# Patient Record
Sex: MALE | Race: WHITE
[De-identification: names, ages, dates, MRNs, and addresses within clinical notes are randomized per-mention and may not be internally consistent; named-entity substitution may affect disease eponyms.]

---

## 2018-09-17 ENCOUNTER — HOSPITAL ENCOUNTER (INPATIENT)
Dept: HOSPITAL 68 - ERH | Age: 63
LOS: 2 days | DRG: 395 | End: 2018-09-19
Attending: SURGERY | Admitting: SURGERY
Payer: COMMERCIAL

## 2018-09-17 VITALS — HEIGHT: 67 IN | WEIGHT: 173 LBS | BODY MASS INDEX: 27.15 KG/M2

## 2018-09-17 DIAGNOSIS — J45.909: ICD-10-CM

## 2018-09-17 DIAGNOSIS — Z88.1: ICD-10-CM

## 2018-09-17 DIAGNOSIS — Z79.51: ICD-10-CM

## 2018-09-17 DIAGNOSIS — F41.9: ICD-10-CM

## 2018-09-17 DIAGNOSIS — K61.2: Primary | ICD-10-CM

## 2018-09-17 DIAGNOSIS — Z88.2: ICD-10-CM

## 2018-09-17 DIAGNOSIS — Z79.52: ICD-10-CM

## 2018-09-17 DIAGNOSIS — F32.9: ICD-10-CM

## 2018-09-17 LAB
ABSOLUTE GRANULOCYTE CT: 12.3 /CUMM (ref 1.4–6.5)
BASOPHILS # BLD: 0 /CUMM (ref 0–0.2)
BASOPHILS NFR BLD: 0.2 % (ref 0–2)
EOSINOPHIL # BLD: 0.4 /CUMM (ref 0–0.7)
EOSINOPHIL NFR BLD: 2.4 % (ref 0–5)
ERYTHROCYTE [DISTWIDTH] IN BLOOD BY AUTOMATED COUNT: 13.2 % (ref 11.5–14.5)
GRANULOCYTES NFR BLD: 75.5 % (ref 42.2–75.2)
HCT VFR BLD CALC: 46.3 % (ref 42–52)
LYMPHOCYTES # BLD: 2.2 /CUMM (ref 1.2–3.4)
MCH RBC QN AUTO: 30.9 PG (ref 27–31)
MCHC RBC AUTO-ENTMCNC: 34.2 G/DL (ref 33–37)
MCV RBC AUTO: 90.3 FL (ref 80–94)
MONOCYTES # BLD: 1.3 /CUMM (ref 0.1–0.6)
PLATELET # BLD: 293 /CUMM (ref 130–400)
PMV BLD AUTO: 7.7 FL (ref 7.4–10.4)
RED BLOOD CELL CT: 5.13 /CUMM (ref 4.7–6.1)
WBC # BLD AUTO: 16.3 /CUMM (ref 4.8–10.8)

## 2018-09-17 PROCEDURE — 0J9B3ZZ DRAINAGE OF PERINEUM SUBCUTANEOUS TISSUE AND FASCIA, PERCUTANEOUS APPROACH: ICD-10-PCS | Performed by: SURGERY

## 2018-09-17 NOTE — ED GENERAL ADULT
History of Present Illness
 
General
Chief Complaint: General Adult
Stated Complaint: "I HAVE A SEVERE RECTAL PROBLEM"
Source: patient, family
Exam Limitations: no limitations
 
Vital Signs & Intake/Output
Vital Signs & Intake/Output
 Vital Signs
 
 
Date Time Temp Pulse Resp B/P B/P Pulse O2 O2 Flow FiO2
 
     Mean Ox Delivery Rate 
 
2121 98.0        
 
1928 98.1 88 20 104/58  93 Room Air  
 
 1649      93   
 
 1620 101.6        
 
 1537      97 Room Air Room Air 
 
 1408 101.6 105 18 118/73  95 Room Air  
 
 1134 99.9 112 20 116/81  95 Room Air  
 
 
 
Allergies
Coded Allergies:
Sulfa (Sulfonamide Antibiotics) (HIVES 18)
amoxicillin (HIVES 18)
clavulanic acid (HIVES 18)
 
Reconcile Medications
Albuterol Sulfate (Proair Hfa) 0.09 MG/Actuation MILLER   2 PUFF INH Q6 ASTHMA  (
Reported)
Albuterol Sulfate/Ipratropiu (Duoneb) 3 MG/3 ML NEB   1 Vial INH/SOL 4 TIMES/DAY
PRN shortness of breath
Alprazolam 0.5 MG TAB   1 TAB PO BID PRN DEPRESSION/ANXIETY  (Reported)
Azithromycin (Zithromax) 250 MG TABLET   1 DP PO AD COPD/BRONCHITIS
     2 the first day followed by 1 for days 2-5
Benzonatate (Tessalon Perle) 100 MG CAPSULE   1 CAP PO TID PRN COUGH
Budesonide/Formoterol Fumara (Symbicort 160-4.5 Mcg Inhaler) 160 MCG/4.5 MCG PUF
  2 PUF INH BID ASTHMA
Guaifenesin/Dextromethorphan (Mucinex Dm -30 MG Tablet) 1 EACH TAB.ER.12H 
 1 TAB PO BIDP PRN BREATHING PROBLEMS  (Reported)
Ipratropium/Albuterol Sulfate (Iprat-Albut 0.5-3(2.5) MG/3 Ml) 0.5 MG-3 MG (2.5 
MG BASE)/3 ML AMPUL.NEB   3 ML INH Q6HR PRN WHEEZING
Ipratropium/Albuterol Sulfate (Iprat-Albut 0.5-3(2.5) MG/3 Ml) 3 ML AMPUL.NEB   
1 VIAL INH Q4-6 PRN WHEEZING
Loratadine 10 MG TABLET   1 MG PO DAILY PRN ALLERGIES
Montelukast Sodium (Singulair) 10 MG TABLET   1 TAB PO AT BEDTIME shortness of 
breath
Prednisone (Deltasone) 20 MG TABLET   3 TAB PO DAILY ASTHMA
Prednisone 10 MG TABLET   0 PO DAILY ASTHMA
     6 TABS PO DAY 1
     5 TABS PO DAY2
     4 TABS PO DAY 3
     3 TABS PO DAY 4
     2 TABS PO DAY 5
     1 TAB PO DAY 6 AND 7
Prednisone 10 MG TABLET   1 TAB PO AD ASTHMA
     DAY1/DAY2 FOUR TABS
     DAY3/DAY4 THREE TABS
     DAY5/DAY6 TWO TABS
     DAY 7 ONE TAB
Robitussin AC (Guaifenesin-Codeine Syrup) 200 MG-20 MG/10 ML LIQUID   10 ML PO 
Q6HR PRN COUGH
Robitussin AC (Guaifenesin-Codeine Syrup) 10 ML LIQUID   10 ML PO TID PRN COUGH
 
Triage Note:
PT TO ER C/C PAIN X 10 DAYS.  STARTED IN LOW ABD
AREA.  PAIN IS NOW ALSO TO RECTAL AREA, AMADOU KIDNEY
AREA, LOW BACK AND AMADOU HIP AREA.  PAIN IS CONSTANT
SINCE ONSET.  HX OF UMBILICAL HERNIA WHICH HAS NOT
BEEN REPAIRED.  LAST BM YESTERDAY, STATES HAD TO
USE A SUPPOSITORY AND VERY LITTLE CAME OUT.  LNBM
LAST WEEK.  STATES HAS DIFFICULTY URINATING AND
HAS TO STIMULATE HIS RECTAL AREA IN ORDER TO GET
URINE OUT.  LAST NORMAL URINARY VOID LAST WEEK
(MONDAY OR TUESDAY).
Triage Nurses Notes Reviewed? yes
Onset: Abrupt
Duration: day(s): (10), constant, continues in ED, getting worse
Timing: single episode today
Injury Environment: home
Severity: moderate, severe
Severity Numbers: 10
No Modifying Factors: none
Modifying Factors:
Worsens With: movement, other (BM). 
HPI:
63-year-old male history of anxiety depression bronchitis presents for 
evaluation of perianal and perirectal pain.  Patient reports he has had this 
pain for the past 10 days is getting worse.  The pain is located in the area of 
the rectum.  The pain is worse with bowel movements.  He states he has an area 
of swelling that he can feel Right outside of his anal area.  He also reports 
that he has not been having normal bowel movements recently and feels like he is
constipated.  He states he did have a small bowel movement yesterday.  No 
diarrhea.  He also reports his asthma symptoms have been worse than usual with 
wheezing and coughing.  He has had fever at home.  He is not taking any pain 
medicine.  Denies trauma to the area.  Additionally he reports because of that 
she's been having difficulty urinating.  He was able to urinate only a small 
amount earlier today.
(Robin Avendano)
 
Past History
 
Travel History
Traveled to [place] past 21 day No
 
Medical History
Any Pertinent Medical History? see below for history
Neurological: migraine, NUMBNESS TO JOINTS
EENT: allergies
Cardiovascular: NONE
Respiratory: asthma, bronchitis, pneumonia
Gastrointestinal: umbilical hernia
Hepatic: NONE
Renal: nephrolithiasis
Musculoskeletal: chronic back pain, falls
Psychiatric: anxiety, depression
Endocrine: NONE
Blood Disorders: NONE
Cancer(s): NONE
GYN/Reproductive: NONE
History of MRSA: No
History of VRE: No
History of CDIFF: No
 
Surgical History
Surgical History: non-contributory
 
Psychosocial History
Who do you live with Spouse
Services at Home None
What is your primary language English
Tobacco Use: Quit >30 days ago
ETOH Use: occasional use
Illicit Drug Use: denies illicit drug use
 
Family History
Family History, If Any:
MOTHER
  FH: diabetes mellitus
 
Hx Contributory? No
(Robin Avendano)
 
Review of Systems
 
Review of Systems
Constitutional:
Reports: no symptoms. 
EENTM:
Reports: no symptoms. 
Respiratory:
Reports: no symptoms. 
Cardiovascular:
Reports: no symptoms. 
GI:
Reports: see HPI, abdominal pain, constipation. 
Genitourinary:
Reports: no symptoms. 
Musculoskeletal:
Reports: no symptoms. 
Skin:
Reports: no symptoms. 
Neurological/Psychological:
Reports: no symptoms. 
Hematologic/Endocrine:
Reports: no symptoms. 
Immunologic/Allergic:
Reports: no symptoms. 
All Other Systems: Reviewed and Negative
(Robin Avendano)
 
Physical Exam
 
Physical Exam
General Appearance: well developed/nourished, no apparent distress, alert, awake
Head: atraumatic, normal appearance
Eyes:
Bilateral: normal appearance, PERRL, EOMI. 
Ears, Nose, Throat: hearing grossly normal
Neck: normal inspection, supple, full range of motion
Respiratory: chest non-tender, no respiratory distress, rhonchi, wheezing
Cardiovascular: regular rate/rhythm, normal peripheral pulses
Peripheral Pulses:
2+ radial (R), 2+ radial (L)
Gastrointestinal: soft, non-tender
Rectal: normal rectal tone, heme negative stool, THERE IS AN AREA OF INDURATION 
ABOUT 4CM IN DIAMETER  LOCATED JUST TO THE LEFT OF THE ANAL CANAL..  iNDURATION 
TRACKS UPWARDS TO HIS SACRUM.  tHIS AREA IS MILDLY ERYTHEMATOUS.  nO DISCHARGE. 
aREA IS EXQUISITELY TENDER.  nO FOCAL FLUCTUANT AREA PALPATED
Back: normal inspection, normal range of motion, no vertebral tenderness
Extremities: normal inspection, normal range of motion, no edema
Neurologic/Psych: no motor/sensory deficits, awake, alert, oriented x 3, normal 
gait, normal mood/affect
Skin: intact, normal color, warm/dry
Lymphatic: no anterior cervical tavon
 
Core Measures
ACS in differential dx? No
CVA/TIA Diagnosis: No
Sepsis Present: Yes
Sepsis Focused Exam Completed? Yes
(Robin Avendano)
 
ED Sepsis Exam
Date of Focused Sepsis Exam: 18
Time of Focused Sepsis Exam: 
Sepsis Cardiac Exam: Tachycardia
Sepsis Resp Exam: Ronchi
Sepsis Cap Refill Exam: <2 Sec
Sepsis Peripheral Pulse Exam: Normal
Sepsis Peripheral Pulse Location: Radial
Sepsis Skin Color Exam: Normal for Ethnicity
Skin Temp/Moisture Exam: Hot/Dry
(Robin Avendano)
 
Progress
Differential Diagnoses
I considered the following diagnoses in my evaluation of the patient: [Perianal 
abscess, perirectal abscess, fistula, sepsis, pneumonia, bronchitis]
 
Plan of Care:
 Orders
 
 
Procedure Date/time Status
 
CBC WITHOUT DIFFERENTIAL  0600 Active
 
Nothing by Mouth  B Active
 
Regular Diet  D Complete
 
TRC EVALUATION (GEN) 2140 Active
 
Pathway - chart 2140 Active
 
Code Status  214 Active
 
Patient Data  211 Active
 
TRUNK AREA CULTURE 2103 Active
 
ED Holding Orders 2058 Active
 
Admit to inpatient 2058 Active
 
Vital Signs 2058 Active
 
Code Status  205 Complete
 
LACTIC ACID  1956 Complete
 
Add-on Test (ER Only)  1644 Active
 
CULTURE,URINE  1607 Active
 
BLOOD CULTURE  1605 Active
 
URINALYSIS  1227 Complete
 
TROPONIN LEVEL  1227 Complete
 
COMPREHENSIVE METABOLIC PANEL  1227 Complete
 
CBC WITHOUT DIFFERENTIAL  1227 Complete
 
EKG  1142 Active
 
VTE Mechanical Prophylaxis   UNK Active
 
Vital Signs   UNK Active
 
Intake & Output   UNK Active
 
Activity/Ambulation   UNK Active
 
 
 Current Medications
 
 
  Sig/Annamarie Start time  Last
 
Medication Dose  Stop Time Status Admin
 
Docusate Sodium 100 MG BID  0900 AC 
 
(Colace)     
 
Vancomycin HCl 1,000 MG DAILY  0900 AC 
 
Sodium Chloride 250 ML    
 
(Normal Saline 0.9%)     
 
Ceftazidime 1,000 MG IQ8  0000 AC 
 
(Fortaz)     
 
Morphine Sulfate 2 MG Q4P PRN  2200 AC 
 
(MORPHINE SULFATE)     
 
Oxycodone/ 2 TAB Q4P PRN  2200 AC 
 
Acetaminophen     
 
(Percocet)     
 
Oxycodone/ 1 TAB Q4P PRN  2200 AC 
 
Acetaminophen     
 
(Percocet)     
 
Acetaminophen 650 MG Q6PRN PRN  2130 AC 
 
(Tylenol)     
 
Albuterol Sulfate 2 PUF Q4 PRN  213 AC 
 
(Ventolin)     
 
Dextrose/Sodium  1,000 ML .Q8H  213 AC 
 
Chloride     2236
 
(D5-Normal Saline)     
 
Ondansetron HCl 4 MG Q6-PRN PRN 2130 AC 
 
(Zofran)     
 
Ipratropium Bromide 2.5 ML ONCE ONE  1615 CAN 
 
(Atrovent)    161  
 
Prednisone 60 MG ONCE ONE  1615 CAN 
 
    1616  
 
 
 Laboratory Tests
 
 
 
18 1807:
Lactic Acid 0.6  L
 
18 1656:
Lactic Acid Cancelled
 
18 1600:
Urine Color YEL, Urine Clarity CLEAR, Urine pH 6.0, Ur Specific Gravity 1.020, 
Urine Protein NEG, Urine Ketones TRACE  H, Urine Nitrite NEG, Urine Bilirubin 
NEG, Urine Urobilinogen 0.2, Ur Leukocyte Esterase NEG, Ur Microscopic EXAM NOT 
REQUIRED, Urine Hemoglobin NEG, Urine Glucose NEG
 
18 1252:
Anion Gap 9, Estimated GFR > 60, BUN/Creatinine Ratio 13.3, Glucose 87, Calcium 
9.3, Total Bilirubin 1.4  H, AST 21, ALT 30, Alkaline Phosphatase 65, Troponin I
< 0.01, Total Protein 7.3, Albumin 4.5, Globulin 2.8, Albumin/Globulin Ratio 1.6
, CBC w Diff MAN DIFF ORDERED, RBC 5.13, MCV 90.3, MCH 30.9, MCHC 34.2, RDW 13.2
, MPV 7.7, Gran % 75.5  H, Lymphocytes % 13.7  L, Monocytes % 8.2, Eosinophils %
2.4, Basophils % 0.2, Absolute Granulocytes 12.3  H, Absolute Lymphocytes 2.2, 
Absolute Monocytes 1.3  H, Absolute Eosinophils 0.4, Absolute Basophils 0, 
Platelet Estimate ADEQUATE, Normocytic RBCs VERIFIED, Normochromic RBCs VERIFIED
 Microbiology
2100  TRUNK: Culture & Sensitivity - RECD
2100  TRUNK: Gram Stain - RECD
 1750  BLOOD: Blood Culture - RECD
 1607  URINE ROUT: Urine Culture - ORD
 1605  BLOOD: Blood Culture - RECD
 
Patient is here for evaluation of rectal pain.  On exam he does have what 
appears to be a perianal or perirectal abscess.  A CT scan will be obtained for 
further evaluation.  Patient has a temp of 101 here and is tachycardic.  He also
has wheezing on exam.  DuoNeb Solu-Medrol was ordered.  Patient was given IV 
Tylenol fluids and Toradol.
 
Blood work shows a white blood cell count of 16,000.  Patient has had a 
persistent fever of 101 here.  CT scan shows a 1 x 2 x 5 cm perirectal/perianal 
abscess.  Patient HAS met sepsis criteria.  Vancomycin and Fortaz was ordered 
along with blood cultures.
 
Spoke with Dr. Ventura from general surgery will have the surgical PA come down 
and evaluate the patient.
 
Patient be admitted by surgery.  He'll require IV antibiotics serial labs 
antibiotics IV fluids
Diagnostic Imaging:
Viewed by Me: Radiology Read.  Discussed w/RAD: Radiology Read. 
Radiology Impression: PATIENT: WILL DOUGLAS  MEDICAL RECORD NO: [id number] PRESENT 
AGE: 63  PATIENT ACCOUNT NO: [id number] : 57  LOCATION: [facility] ORDERING 
PHYSICIAN: Robin BROWN     SERVICE DATE:  EXAM TYPE: RAD - XRY-
PORTABLE CHEST XRAY EXAMINATION: XR PORTABLE CHEST CLINICAL INFORMATION: Cough. 
Shortness of breath, fever. Presumptive diagnosis of pneumonia, CHF. COMPARISON:
No prior chest x-rays, most recent of which is dated 2018. TECHNIQUE: 
Portable AP semierect view of the chest was obtained. FINDINGS: The 
cardiomediastinal silhouette is within normal limits in size. Lungs bilaterally 
are symmetrically expanded. Minimal chronic linear atelectasis is seen in the 
left lung base. No focal consolidation, effusion, pulmonary edema or 
pneumothorax is seen. Nonfusion of the posterior elements in the lower cervical 
spine is seen. Minimal convex right thoracolumbar scoliosis is seen. Bony 
structures are otherwise unremarkable. IMPRESSION: Minimal chronic linear 
atelectasis in the left lung base. No focal pneumonia. No evidence of pulmonary 
edema. DICTATED BY: Cindy Ott MD  DATE/TIME DICTATED:18 
:RAD.CEVALLOS  DATE/TIME TRANSCRIBED:18 CONFIDENTIAL, 
DO NOT COPY WITHOUT APPROPRIATE AUTHORIZATION.  <Electronically signed in Other 
Vendor System>                         
Initial ED EKG: normal sinus rhythm, RBBB
(Robin Avendano)
 
Departure
 
Departure
Disposition: STILL A PATIENT
Condition: Stable
Clinical Impression
Primary Impression: Perirectal abscess
Secondary Impressions: 
Sepsis
Qualifiers:  Sepsis type: sepsis due to unspecified organism Qualified Code: 
A41.9 - Sepsis, unspecified organism
 
Referrals:
Roe SHAW,Anant JOAQUIN (PCP/Family)
 
Departure Forms:
Customer Survey
General Discharge Information
 
Admission Note
Spoke With:
Jann Ventura DO
Documentation of Exam:
Documentation of any treatments & extenuating circumstances including Concerns 
Regarding Discharge (functional status, medication knowledge or non-compliance, 
living conditions, etc.) that warrant an admission rather than observation: [
Serial labs IV pain meds IV fluids IV antibiotics surgical consult monitoring of
signs]
 
(Robin Avendano)
 
PA/NP Co-Sign Statement
Statement:
ED Attending supervision documentation-
 
[X] I saw and evaluated the patient. I have also reviewed all the pertinent lab 
results and diagnostic results. I agree with the findings and the plan of care 
as documented in the PA's/NP's documentation. 
 
[X] I have reviewed the ED Record and agree with the PA's/NP's documentation.
 
[] Additions or exceptions (if any) to the PAs/NP's note and plan are 
summarized below:
[Patient.  Admitted for perirectal abscess status post I&D.  Patient will need 
IV fluids, IV antibiotics]
 
(Jeni SHAW,Adi JOAQUIN)
 
Critical Care Note
 
Critical Care Note
Critical Care Time: 30-74 min
(Black PA,Robin)

## 2018-09-17 NOTE — HISTORY & PHYSICAL PRE-OP
**See Addendum**
General Information and HPI
History of Present Illness:
63m presents to the ED with 2week hx worsening perirectal pain, now with fevers 
up to 101 in the ED.  Pain began 2 weeks ago and has slowly progressed.  No 
history of anaorectal disease or GI diagnoses.  Has been constipated over last 
few weeks, no account of specific injury from passing hard stool or any other 
trauma.  No hx fistulas/fissures/hemorrhoids/melena/brbpr.  fevers/chills today/
last night with worsening pain, so rpesents to ED.
 
Allergies/Medications
Allergies:
Coded Allergies:
Sulfa (Sulfonamide Antibiotics) (HIVES 09/17/18)
amoxicillin (HIVES 09/17/18)
clavulanic acid (HIVES 09/17/18)
 
Home Med list
Albuterol Sulfate (Proair Hfa) 0.09 MG/Actuation MILLER   2 PUFF INH Q6 ASTHMA  (
Reported)
Albuterol Sulfate/Ipratropiu (Duoneb) 3 MG/3 ML NEB   1 Vial INH/SOL 4 TIMES/DAY
PRN shortness of breath
Alprazolam 0.5 MG TAB   1 TAB PO BID PRN DEPRESSION/ANXIETY  (Reported)
Azithromycin (Zithromax) 250 MG TABLET   1 DP PO AD COPD/BRONCHITIS
     2 the first day followed by 1 for days 2-5
Benzonatate (Tessalon Perle) 100 MG CAPSULE   1 CAP PO TID PRN COUGH
Budesonide/Formoterol Fumara (Symbicort 160-4.5 Mcg Inhaler) 160 MCG/4.5 MCG PUF
  2 PUF INH BID ASTHMA
Guaifenesin/Dextromethorphan (Mucinex Dm -30 MG Tablet) 1 EACH TAB.ER.12H 
 1 TAB PO BIDP PRN BREATHING PROBLEMS  (Reported)
Ipratropium/Albuterol Sulfate (Iprat-Albut 0.5-3(2.5) MG/3 Ml) 0.5 MG-3 MG (2.5 
MG BASE)/3 ML AMPUL.NEB   3 ML INH Q6HR PRN WHEEZING
Ipratropium/Albuterol Sulfate (Iprat-Albut 0.5-3(2.5) MG/3 Ml) 3 ML AMPUL.NEB   
1 VIAL INH Q4-6 PRN WHEEZING
Loratadine 10 MG TABLET   1 MG PO DAILY PRN ALLERGIES
Montelukast Sodium (Singulair) 10 MG TABLET   1 TAB PO AT BEDTIME shortness of 
breath
Prednisone (Deltasone) 20 MG TABLET   3 TAB PO DAILY ASTHMA
Prednisone 10 MG TABLET   0 PO DAILY ASTHMA
     6 TABS PO DAY 1
     5 TABS PO DAY2
     4 TABS PO DAY 3
     3 TABS PO DAY 4
     2 TABS PO DAY 5
     1 TAB PO DAY 6 AND 7
Prednisone 10 MG TABLET   1 TAB PO AD ASTHMA
     DAY1/DAY2 FOUR TABS
     DAY3/DAY4 THREE TABS
     DAY5/DAY6 TWO TABS
     DAY 7 ONE TAB
Robitussin AC (Guaifenesin-Codeine Syrup) 200 MG-20 MG/10 ML LIQUID   10 ML PO 
Q6HR PRN COUGH
Robitussin AC (Guaifenesin-Codeine Syrup) 10 ML LIQUID   10 ML PO TID PRN COUGH
 
 
Past History
 
Medical History
Neurological: migraine
EENT: allergies (seasonal)
Cardiovascular: NONE
Respiratory: asthma, bronchitis
Hepatic: NONE
Renal: nephrolithiasis
Musculoskeletal: chronic back pain
Psychiatric: anxiety, depression
Endocrine: NONE
Blood Disorders: NONE
Cancer(s): NONE
GYN/Reproductive: NONE
History of MRSA: No
History of VRE: No
History of CDIFF: No
 
Surgical History
Pertinent Surgical History: none
 
Past Family/Social History
 
Family History
Relations & Conditions if any
MOTHER
  FH: diabetes mellitus
 
 
Psychosocial History
Who Do You Live With? spouse
Services at Home None
Smoking Status: Never Smoked
ETOH Use: occasional use
Illicit Drug Use: denies illicit drug use
 
Functional Ability
ADLs
Independent: dressing, eating, toileting, bathing. 
Ambulation: independent
IADLs
Independent: shopping, housework, finances, food prep, telephone, transportation
, medication admin. 
 
Exam & Diagnostic Data
Last 24 Hrs of Vital Signs/I&O
 Vital Signs
 
 
Date Time Temp Pulse Resp B/P B/P Pulse O2 O2 Flow FiO2
 
     Mean Ox Delivery Rate 
 
09/17 2121 98.0        
 
09/17 1928 98.1 88 20 104/58  93 Room Air  
 
09/17 1649      93   
 
09/17 1620 101.6        
 
09/17 1537      97 Room Air Room Air 
 
09/17 1408 101.6 105 18 118/73  95 Room Air  
 
09/17 1134 99.9 112 20 116/81  95 Room Air  
 
 
 Intake & Output
 
 
 09/17 1600 09/17 0800 09/17 0000
 
Intake Total   
 
Output Total 350  
 
Balance -350  
 
    
 
Output, Urine 350  
 
Patient 180 lb  
 
Weight   
 
Weight Reported by Patient  
 
Measurement   
 
Method   
 
 
 
Physical Exam:
gen- nad
[id number]
pulm-ctab no wheeze
abd- soft nt
anorectal- indurated L to anal verge, very ttp, skin closed.  CYN with 
significant pain.  multiple nonthrombosed skintags externally. fluctuant, 
indurated most posterior to anal verge. 
ext- calves soft nt
 
PERIANAL ABSCESS I&D: 
performed at bedside.  skin cleansed- 8cc 1%lido injected, area probed w needle 
to localized collection, incision made, tissue spread/probed, approx 10cc 
purulent material expressed.  cleansed w NS, clean dry dressing applied. culture
sent.  pt tolerated well.
Last 24 Hrs of Labs/Marcial:
 Laboratory Tests
 
09/17/18 1807:
Lactic Acid 0.6  L
 
09/17/18 1656:
Lactic Acid Cancelled
 
09/17/18 1600:
Urine Color YEL, Urine Clarity CLEAR, Urine pH 6.0, Ur Specific Gravity 1.020, 
Urine Protein NEG, Urine Ketones TRACE  H, Urine Nitrite NEG, Urine Bilirubin 
NEG, Urine Urobilinogen 0.2, Ur Leukocyte Esterase NEG, Ur Microscopic EXAM NOT 
REQUIRED, Urine Hemoglobin NEG, Urine Glucose NEG
 
09/17/18 1252:
Anion Gap 9, Estimated GFR > 60, BUN/Creatinine Ratio 13.3, Glucose 87, Calcium 
9.3, Total Bilirubin 1.4  H, AST 21, ALT 30, Alkaline Phosphatase 65, Troponin I
< 0.01, Total Protein 7.3, Albumin 4.5, Globulin 2.8, Albumin/Globulin Ratio 1.6
, CBC w Diff MAN DIFF ORDERED, RBC 5.13, MCV 90.3, MCH 30.9, MCHC 34.2, RDW 13.2
, MPV 7.7, Gran % 75.5  H, Lymphocytes % 13.7  L, Monocytes % 8.2, Eosinophils %
2.4, Basophils % 0.2, Absolute Granulocytes 12.3  H, Absolute Lymphocytes 2.2, 
Absolute Monocytes 1.3  H, Absolute Eosinophils 0.4, Absolute Basophils 0, 
Platelet Estimate ADEQUATE, Normocytic RBCs VERIFIED, Normochromic RBCs VERIFIED
 Microbiology
09/17 2100  TRUNK: Culture & Sensitivity - RECD
09/17 2100  TRUNK: Gram Stain - RECD
09/17 1750  BLOOD: Blood Culture - RECD
09/17 1607  URINE ROUT: Urine Culture - ORD
09/17 1605  BLOOD: Blood Culture - RECD
 
 
 
Assessment/Plan
Assessment/Plan:
A- 63yoM with perianal abscess with fever and leukocytosis, sp beside I&D, 
stable
 
P-
admit as inpt
prn pain meds
stool softeners
iv abx
am labs
tylenol prn
home meds
reasses wound in am
d/w dr. mendoza
 
As Ranked By This Provider
Problem List:
 1. Perirectal abscess
 
 2. Asthma

## 2018-09-17 NOTE — RADIOLOGY REPORT
EXAMINATION:
XR PORTABLE CHEST
 
CLINICAL INFORMATION:
Cough. Shortness of breath, fever. Presumptive diagnosis of pneumonia, CHF.
 
COMPARISON:
No prior chest x-rays, most recent of which is dated 2/20/2018.
 
TECHNIQUE:
Portable AP semierect view of the chest was obtained.
 
FINDINGS:
The cardiomediastinal silhouette is within normal limits in size.
 
Lungs bilaterally are symmetrically expanded. Minimal chronic linear
atelectasis is seen in the left lung base. No focal consolidation, effusion,
pulmonary edema or pneumothorax is seen.
 
Nonfusion of the posterior elements in the lower cervical spine is seen.
Minimal convex right thoracolumbar scoliosis is seen. Bony structures are
otherwise unremarkable.
 
IMPRESSION:
Minimal chronic linear atelectasis in the left lung base. No focal pneumonia.
No evidence of pulmonary edema.

## 2018-09-17 NOTE — CT SCAN REPORT
EXAMINATION:
CT ABDOMEN AND PELVIS WITH CONTRAST
 
CLINICAL INFORMATION:
Perianal abscess. Rectal abscess. Constipation. Abdominal pain. Fever.
 
COMPARISON:
CT abdomen and pelvis 08/06/2013.
 
TECHNIQUE:
Multidetector volumetric imaging was performed of the abdomen and pelvis
following IV administration of 95 mL of Optiray 320 intravenous contrast.
Sagittal and coronal reformatted images were obtained on the technologist's
workstation.
 
DLP:
447.1 mGy-cm.
 
FINDINGS:
 
LUNG BASES: The visualized lung bases are unremarkable.
 
LIVER, GALLBLADDER, AND BILIARY TREE: The liver is normal in size, shape, and
attenuation. No focal hepatic lesion or biliary ductal dilatation is present.
The gallbladder is unremarkable with no evidence of radiopaque gallstones,
gallbladder wall thickening, or obvious pericholecystic inflammatory changes.
 
 
PANCREAS: Unremarkable.
 
SPLEEN: Unremarkable.
 
ADRENAL GLANDS: Unremarkable.
 
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
cortex of the right kidney. There is a 1.5 cm cortical cyst at the lower pole
cortex of the right kidney.
 
There is no renal or ureteral calculus. There is no hydronephrosis.
 
BLADDER: Unremarkable.
 
GASTROINTESTINAL TRACT: In the low perineum to the left of midline, there is
an area of induration and edema consistent with history of a perirectal
abscess. There is a fluid collection in the central region of this induration
that measures 1.5 x 2.1 x 5.2 cm.
 
There is no intrapelvic edema or inflammation. There is no bowel wall
thickening or edema. There is no bowel obstruction. Moderate volume of stool
throughout the colon.
The appendix is not seen. The small bowel loops are unremarkable.
 
ABDOMINAL WALL: There is an umbilical hernia. The defect of the wall measures
2 cm transverse. The herniated fat pocket measures 2 x 4 x 2.5 cm. There is
subtle stranding in the herniated fat pocket as well as a small region of
stranding in the immediate adjacent fat in the anterior mid abdomen related
to the herniation, sagittal image 60.
 
LYMPH NODES: Normal.
 
VASCULAR: Unremarkable.
 
PELVIC VISCERA: Unremarkable.
 
OSSEOUS STRUCTURES: Unremarkable.
 
IMPRESSION:
1. There is induration and edema with an associated small abscess in the low
perineum just to left of midline adjacent to the anal verge.
 
2. Umbilical hernia with mild edema in the mesenteric fat herniated through
the umbilical defect and the adjacent mesenteric fat just deep to the hernia.

## 2018-09-18 VITALS — DIASTOLIC BLOOD PRESSURE: 80 MMHG | SYSTOLIC BLOOD PRESSURE: 125 MMHG

## 2018-09-18 VITALS — DIASTOLIC BLOOD PRESSURE: 86 MMHG | SYSTOLIC BLOOD PRESSURE: 118 MMHG

## 2018-09-18 VITALS — DIASTOLIC BLOOD PRESSURE: 80 MMHG | SYSTOLIC BLOOD PRESSURE: 118 MMHG

## 2018-09-18 VITALS — SYSTOLIC BLOOD PRESSURE: 120 MMHG | DIASTOLIC BLOOD PRESSURE: 80 MMHG

## 2018-09-18 LAB
ABSOLUTE GRANULOCYTE CT: 13.4 /CUMM (ref 1.4–6.5)
BASOPHILS # BLD: 0.1 /CUMM (ref 0–0.2)
BASOPHILS NFR BLD: 0.4 % (ref 0–2)
EOSINOPHIL # BLD: 0 /CUMM (ref 0–0.7)
EOSINOPHIL NFR BLD: 0.1 % (ref 0–5)
ERYTHROCYTE [DISTWIDTH] IN BLOOD BY AUTOMATED COUNT: 13.7 % (ref 11.5–14.5)
GRANULOCYTES NFR BLD: 83.7 % (ref 42.2–75.2)
HCT VFR BLD CALC: 38.9 % (ref 42–52)
LYMPHOCYTES # BLD: 1.8 /CUMM (ref 1.2–3.4)
MCH RBC QN AUTO: 31.1 PG (ref 27–31)
MCHC RBC AUTO-ENTMCNC: 34.5 G/DL (ref 33–37)
MCV RBC AUTO: 90.1 FL (ref 80–94)
MONOCYTES # BLD: 0.7 /CUMM (ref 0.1–0.6)
PLATELET # BLD: 241 /CUMM (ref 130–400)
PMV BLD AUTO: 8.3 FL (ref 7.4–10.4)
RED BLOOD CELL CT: 4.31 /CUMM (ref 4.7–6.1)
WBC # BLD AUTO: 16 /CUMM (ref 4.8–10.8)

## 2018-09-18 NOTE — PATIENT DISCHARGE INSTRUCTIONS
Discharge Instructions
 
General Discharge Information
You were seen/treated for:
Arlene-anal abscess
You had these procedures:
Incision and drainage of abscess with administration of intravenous antibiotics
Watch for these problems:
Worsening pain to rectum
Worsening redness, warmth or drainage from arlene-anal area
Inability to pass gas or move bowels
Fever, flu like symptoms
Other wound care:
-Daily dry dressing changes as needed
-sitz baths 2-3 wicho a day for 20min at a time
-avoid constipation.  Take stool softeners as needed
Special Instructions:
Recommend follow up with colorectal surgeon Dr. John Bruce
 
Diet
Continue normal diet: Yes
 
Activity
Full Activity/No Limits: No
Activity Self Limited: Yes
 
Acute Coronary Syndrome
 
Inclusion Criteria
At DC or during hospital stay patient has or had the following:
ACS DIAGNOSIS No
 
Discharge Core Measures
Meds if any: Prescribed or Continued at Discharge
Meds if any: NOT Prescribed or Continued at Discharge
 
Congestive Heart Failure
 
Inclusion Criteria
At DC or during hospital stay patient has or had the following:
CHF DIAGNOSIS No
 
Discharge Core Measures
Meds if any: Prescribed or Continued at Discharge
Meds if any: NOT Prescribed or Continued at Discharge
 
Cerebrovascular accident
 
Inclusion Criteria
At DC or during hospital stay patient has or had the following:
CVA/TIA Diagnosis No
 
Discharge Core Measures
Meds if any: Prescribed or Continued at Discharge
Meds if any: NOT Prescribed or Continued at Discharge
 
Venous thromboembolism
 
Inclusion Criteria
VTE Diagnosis No
VTE Type NONE
VTE Confirmed by (Test) NONE
 
Discharge Core Measures
- Per Current guidelines, there needs to be overlap
- treatment for the first 5 days of Warfarin therapy.
- If discharged on Warfarin prior to 5 days of
- overlap therapy, the patient will need to be
- assessed for post discharge needs including
- *Post discharge parental anticoagulation
- *Warfarin and/or parental anticoagulation education
- *Follow up date to check INR post discharge
At least 5 days overlap therapy as Inpatient No
Meds if any: Prescribed or Continued at Discharge
Note: Overlap Therapy is Warfarin and Anticoagulant
Meds if any: NOT Prescribed or Continued at Discharge

## 2018-09-18 NOTE — PN- GENERAL SURGERY
Subjective
Subjective:
Symptoms improving overnight post bedside I&D.  Currently without pain, fever 
and flu like symptoms have resolved.  Passing flatus, no bm yet.  No complaints 
of chest pain or shortness of breath.  Has been npo, feels that appetite has 
returned.
 
Objective
Vital Signs and I&Os
Vital Signs
 
 
Date Time Temp Pulse Resp B/P B/P Pulse O2 O2 Flow FiO2
 
     Mean Ox Delivery Rate 
 
09/18 0650 97.9 87 20 118/80  95 Room Air  
 
09/18 0231       Room Air  
 
09/18 0222 97.7 93 22 118/86  94 Room Air  
 
09/18 0151 97.5 83 20 96/63  94 Room Air  
 
09/17 2121 98.0        
 
09/17 1928 98.1 88 20 104/58  93 Room Air  
 
09/17 1649      93   
 
09/17 1620 101.6        
 
09/17 1537      97 Room Air Room Air 
 
09/17 1408 101.6 105 18 118/73  95 Room Air  
 
09/17 1134 99.9 112 20 116/81  95 Room Air  
 
 
 Intake & Output
 
 
 09/18 1600 09/18 0800 09/18 0000 09/17 1600 09/17 0800 09/17 0000
 
Intake Total  650 2100   
 
Output Total    350  
 
Balance  650 2100 -350  
 
       
 
Intake, IV  650 2100   
 
Output, Urine    350  
 
Patient  173 lb  180 lb  
 
Weight      
 
Weight  Bed scale  Reported by Patient  
 
Measurement      
 
Method      
 
 
 
Physical Exam:
General:  Alert and oriented x3, no acute distress
Cardiac:  RRR, s1s2
Pulm:  Non-labored respiratory effort
Abd:  Non-tender, non-distended
Surgical site:  Left side, arlene-anal area.  No induration, minimal erythema.  
Some scant blood on dressing, no purulence expressed on current exam, 
periincisional area soft.
Extremties:  Moves all extremities, distal sensation grossly intact.  Bilateral 
calves soft/non-tender.
 
Assessment/Plan
Assessment/Plan
This is a 63  year old male, hospital day 1, s/p bedside I&D of arlene-anal 
abscess.  Admitted to hospital for administration of iv antibiotics and pain 
regimen as well as close monitoring of cbc and vital signs as pt was febrile 
with leukocytosis pre procedure, also frequent wound checks.
 
Wound irrigated this am, dressing changed.
 
-Wound looks improved today, will likely hold off on surgical intervention, Dr. Ventura to see shortly
-NPO for now, will likely feed this afternoon
-Continue iv ceftaz and vancomycin
-Continue dry dressing
-OOB okay
Will discuss plan of care with Dr. Ventura
 
 
 
Core Measures
 
Venous Thromboembolism
VTE Risk Factors Surgery
No Mechanical VTE Prophylaxis d/t N/A MechProphylax Ordered
No VTE Pharm Prophylaxis d/t NA PharmProphylax ordered

## 2018-09-18 NOTE — SURGICAL DISCHARGE SUMMARY
Visit Information
 
Visit Dates
Admission Date:
09/17/18
 
 
History of Present Illness
Chief Complaint:
Rectal pain related to abscess
 
Medical History
Blood Transfusion Hx: No
Neurological: migraine
EENT: allergies (seasonal)
Cardiovascular: NONE
Respiratory: asthma, bronchitis
Hepatic: NONE
Renal: nephrolithiasis
Musculoskeletal: chronic back pain
Psychiatric: anxiety, depression
Endocrine: NONE
Blood Disorders: NONE
Cancer(s): NONE
GYN/Reproductive: NONE
History of MRSA: No
History of VRE: No
History of CDIFF: No
Isolation History: Standard
Influenza Vaccine: 10/01/15
 
Surgical History
Pertinent Surgical History: none
 
Family History
Relations & Conditions If Any:
MOTHER
  FH: diabetes mellitus
 
 
Psychosocial History
Where Do You Live? Home
Who Do You Live With? Spouse
Services at Home: None
What is Your Primary Language? English
ETOH Use: occasional use
Review of Systems:
See H&P
 
Hospital Course
 
Course
Attending Physician:
Jann Ventura DO
 
Primary Care Physician:
Anant Au MD
 
Hospital Course:
Urbano was admitted to the hospital on 9/17/2018 with complaints of worsening sophia
-anal pain along with fever and flu like illness.  A bedside incision and 
drainage was done, the result of which was approximately 10 cc of purulent 
drainage expressed.  Cultures were sent.  The patient was placed on IV 
antibiotics.  At the time of hospital discharge, his vital signs were stable and
within normal limits, he was afebrile, leukocytosis improved, pain improved.
Allergies:
Coded Allergies:
Sulfa (Sulfonamide Antibiotics) (HIVES 09/17/18)
amoxicillin (HIVES 09/17/18)
clavulanic acid (HIVES 09/17/18)
 
Significant Procedures:
bedside incision and drainage of perirectal abscess
 
Disposition Summary
 
Disposition
Principal Diagnosis:
Sophia-anal abscess
Additional Diagnosis:
sp bedside I&D
Discharge Disposition: home or self care
 
Discharge Instructions
 
General Discharge Information
Code Status: Full Code
Patient's Diet:
Regular
Patient's Activity:
As tolerated
Follow-Up Instructions/Appts:
Follow up with Dr. Ventura in one week from hospitalization
Recommend follow up with colorectal surgeon for further management, recommend 
Dr. John Bruce
 
Medications at Discharge
Discharge Medications:
Stop taking the following medications:
Robitussin AC (Guaifenesin-Codeine Syrup) 200 MG-20 MG/10 ML LIQUID ORAL Q6HR as
needed for COUGH Qty = 120
 
Guaifenesin/Dextromethorphan (Mucinex Dm -30 MG Tablet) 1 EACH TAB.ER.12H 
ORAL 2 x Daily as needed as needed for BREATHING PROBLEMS 
 
Prednisone (Prednisone) 10 MG TABLET ORAL As Directed Qty = 21
 
Benzonatate (Tessalon Perle) 100 MG CAPSULE ORAL THREE TIMES DAILY as needed for
COUGH Qty = 21
 
Robitussin AC (Guaifenesin-Codeine Syrup) 10 ML LIQUID ORAL THREE TIMES DAILY as
needed for COUGH Days = 7
 
Prednisone (Deltasone) 20 MG TABLET ORAL DAILY Days = 5
 
Azithromycin (Zithromax) 250 MG TABLET ORAL As Directed Qty = 6
 
Prednisone (Prednisone) 10 MG TABLET ORAL DAILY Qty = 22
 
Continue taking these medications:
Albuterol Sulfate (Proair Hfa) 0.09 MG/Actuation MILLER
    2 PUFF Inhale through mouth EVERY SIX HOURS
    Comments:
       NOT GIVEN IN HOSPITAL
 
Albuterol Sulfate/Ipratropiu (Duoneb) 3 MG/3 ML NEB
    1 Vial Inhale Solution 4 TIMES A DAY as needed for shortness of breath
    Qty = 360
    Comments:
       Last Taken: 4/23/16
             Time: 9:00 AM
 
Budesonide/Formoterol Fumara (Symbicort 160-4.5 Mcg Inhaler) 160 MCG/4.5 MCG PUF
    2 Puff Inhale through mouth TWICE DAILY
    Days = 30
    Comments:
       Last Taken: 4/23/16
             Time: 10:00 AM
 
Alprazolam (Alprazolam) 0.5 MG TAB
    1 Tablet ORAL TWICE DAILY as needed for DEPRESSION/ANXIETY
    Qty = 60
    Comments:
       NOT GIVEN IN HOSPITAL
 
Loratadine (Loratadine) 10 MG TABLET
    1 Milligram ORAL DAILY as needed for ALLERGIES
    Qty = 30
    Comments:
       Last Taken: 4/23/16
             Time: 10:00 AM
 
Montelukast Sodium (Singulair) 10 MG TABLET
    1 Tablet ORAL AT BEDTIME
    Qty = 30
    Comments:
       Last Taken: 4/22/16
             Time: 9:30 PM
 
Ipratropium/Albuterol Sulfate (Iprat-Albut 0.5-3(2.5) MG/3 Ml) 3 ML AMPUL.NEB
    1 VIAL Inhale through mouth EVERY 4-6 HOURS as needed for WHEEZING
    Qty = 100
 
Ipratropium/Albuterol Sulfate (Iprat-Albut 0.5-3(2.5) MG/3 Ml) 0.5 MG-3 MG (2.5 
MG BASE)/3 ML AMPUL.NEB
    3 Milliliters Inhale through mouth Q6HR as needed for WHEEZING
    Qty = 1
 
Montelukast Sodium (Montelukast Sodium) 10 MG TABLET
    1 Tablet ORAL DAILY
    Qty = 30
 
Start taking the following new medications:
Metronidazole (Flagyl) 250 MG TABLET
    500 Milligram ORAL EVERY 12 HOURS
    Qty = 20
    No Refills
 
Ciprofloxacin HCl (Cipro) 500 MG TABLET
    1 Tablet ORAL TWICE DAILY
    Qty = 20
    No Refills
 
Oxycodone HCl/Acetaminophen (Percocet 5-325 MG Tablet) 5 MG-325 MG TABLET
    1 Tablet ORAL EVERY 4-6 HOURS as needed for postop pain
    Qty = 18
    No Refills

## 2018-09-19 VITALS — SYSTOLIC BLOOD PRESSURE: 100 MMHG | DIASTOLIC BLOOD PRESSURE: 66 MMHG

## 2018-09-19 LAB
ABSOLUTE GRANULOCYTE CT: 6.8 /CUMM (ref 1.4–6.5)
BASOPHILS # BLD: 0.1 /CUMM (ref 0–0.2)
BASOPHILS NFR BLD: 0.7 % (ref 0–2)
EOSINOPHIL # BLD: 0.3 /CUMM (ref 0–0.7)
EOSINOPHIL NFR BLD: 3.2 % (ref 0–5)
ERYTHROCYTE [DISTWIDTH] IN BLOOD BY AUTOMATED COUNT: 13.5 % (ref 11.5–14.5)
GRANULOCYTES NFR BLD: 67.1 % (ref 42.2–75.2)
HCT VFR BLD CALC: 37.7 % (ref 42–52)
LYMPHOCYTES # BLD: 2.3 /CUMM (ref 1.2–3.4)
MCH RBC QN AUTO: 31 PG (ref 27–31)
MCHC RBC AUTO-ENTMCNC: 34.4 G/DL (ref 33–37)
MCV RBC AUTO: 90 FL (ref 80–94)
MONOCYTES # BLD: 0.6 /CUMM (ref 0.1–0.6)
PLATELET # BLD: 254 /CUMM (ref 130–400)
PMV BLD AUTO: 8.2 FL (ref 7.4–10.4)
RED BLOOD CELL CT: 4.18 /CUMM (ref 4.7–6.1)
WBC # BLD AUTO: 10.1 /CUMM (ref 4.8–10.8)

## 2018-09-19 NOTE — PN- GENERAL SURGERY
Subjective
Subjective:
feeling much better, perianal pain improved.  +bmx2, no difficulties.  no issues
voiding.  no fevers.  oob, ambulating.  no cp/sob/n/v.  clay diet
 
Objective
Vital Signs and I&Os
Vital Signs
 
 
Date Time Temp Pulse Resp B/P B/P Pulse O2 O2 Flow FiO2
 
     Mean Ox Delivery Rate 
 
09/19 0658      94 Room Air  
 
09/19 0643 98.2 84 20 100/66  92 Room Air  
 
09/18 2216 98.7 95 18 120/80  94 Room Air  
 
09/18 1905      93 Room Air  
 
09/18 1427 98.5 78 20 125/80  96 Room Air  
 
09/18 1329       Room Air  
 
09/18 1310      92 Room Air  
 
09/18 0800       Room Air  
 
 
 Intake & Output
 
 
 09/19 0800 09/19 0000 09/18 1600 09/18 0800 09/18 0000 09/17 1600
 
Intake Total 250 1100  
 
Output Total   300   350
 
Balance 250 1100  -350
 
       
 
Intake, IV 10   
 
Intake, Oral 240 1100 240   
 
Number  1    
 
Bowel      
 
Movements      
 
Output, Urine   300   350
 
Patient    173 lb  180 lb
 
Weight      
 
Weight    Bed scale  Reported by Patient
 
Measurement      
 
Method      
 
 
 
Physical Exam:
gen- nad
card-s1s2
pulm- ctab no wheeze
abd- soft nt
anorectal- no induration or erythema, ttp at posterior anal verge at incision, 
no flutuance, skin probed w qtip and cleansed w ns, no purulence, minimal 
serosang on dressing.  
ext- calves soft nt bl
 
 
CBC- pending
Cx- GNR prelim
 
Results
Last 48 Hours of Labs:
 Laboratory Tests
 
 
 09/18 09/17 09/17
 
 1010 1807 1656
 
Chemistry   
 
  Lactic Acid (0.7 - 2.1 mmol/L)  0.6  L Cancelled
 
Hematology   
 
  CBC w Diff NO MAN DIFF REQ  
 
  WBC (4.8 - 10.8 /CUMM) 16.0  H  
 
  RBC (4.70 - 6.10 /CUMM) 4.31  L  
 
  Hgb (14.0 - 18.0 G/DL) 13.4  L  
 
  Hct (42 - 52 %) 38.9  L  
 
  MCV (80.0 - 94.0 FL) 90.1  
 
  MCH (27.0 - 31.0 PG) 31.1  H  
 
  MCHC (33.0 - 37.0 G/DL) 34.5  
 
  RDW (11.5 - 14.5 %) 13.7  
 
  Plt Count (130 - 400 /CUMM) 241  
 
  MPV (7.4 - 10.4 FL) 8.3  
 
  Gran % (42.2 - 75.2 %) 83.7  H  
 
  Lymphocytes % (20.5 - 51.1 %) 11.1  L  
 
  Monocytes % (1.7 - 9.3 %) 4.7  
 
  Eosinophils % (0 - 5 %) 0.1  
 
  Basophils % (0.0 - 2.0 %) 0.4  
 
  Absolute Granulocytes (1.4 - 6.5 /CUMM) 13.4  H  
 
  Absolute Lymphocytes (1.2 - 3.4 /CUMM) 1.8  
 
  Absolute Monocytes (0.10 - 0.60 /CUMM) 0.7  H  
 
  Absolute Eosinophils (0.0 - 0.7 /CUMM) 0  
 
  Absolute Basophils (0.0 - 0.2 /CUMM) 0.1  
 
 
 
 
 09/17 09/17
 
 1600 1252
 
Chemistry  
 
  Sodium (137 - 145 mmol/L)  138
 
  Potassium (3.5 - 5.1 mmol/L)  4.6
 
  Chloride (98 - 107 mmol/L)  101
 
  Carbon Dioxide (22 - 30 mmol/L)  28
 
  Anion Gap (5 - 16)  9
 
  BUN (9 - 20 mg/dL)  12
 
  Creatinine (0.7 - 1.2 mg/dL)  0.9
 
  Estimated GFR (>60 ml/min)  > 60
 
  BUN/Creatinine Ratio (7 - 25 %)  13.3
 
  Glucose (65 - 99 mg/dL)  87
 
  Calcium (8.4 - 10.2 mg/dL)  9.3
 
  Total Bilirubin (0.2 - 1.3 mg/dL)  1.4  H
 
  AST (17 - 59 U/L)  21
 
  ALT (21 - 72 U/L)  30
 
  Alkaline Phosphatase (< 127 U/L)  65
 
  Troponin I (<0.11 ng/ml)  < 0.01
 
  Total Protein (6.3 - 8.2 g/dL)  7.3
 
  Albumin (3.5 - 5.0 g/dL)  4.5
 
  Globulin (1.9 - 4.2 gm/dL)  2.8
 
  Albumin/Globulin Ratio (1.1 - 2.2 %)  1.6
 
Hematology  
 
  CBC w Diff  MAN DIFF ORDERED
 
  WBC (4.8 - 10.8 /CUMM)  16.3  H
 
  RBC (4.70 - 6.10 /CUMM)  5.13
 
  Hgb (14.0 - 18.0 G/DL)  15.8
 
  Hct (42 - 52 %)  46.3
 
  MCV (80.0 - 94.0 FL)  90.3
 
  MCH (27.0 - 31.0 PG)  30.9
 
  MCHC (33.0 - 37.0 G/DL)  34.2
 
  RDW (11.5 - 14.5 %)  13.2
 
  Plt Count (130 - 400 /CUMM)  293
 
  MPV (7.4 - 10.4 FL)  7.7
 
  Gran % (42.2 - 75.2 %)  75.5  H
 
  Lymphocytes % (20.5 - 51.1 %)  13.7  L
 
  Monocytes % (1.7 - 9.3 %)  8.2
 
  Eosinophils % (0 - 5 %)  2.4
 
  Basophils % (0.0 - 2.0 %)  0.2
 
  Absolute Granulocytes (1.4 - 6.5 /CUMM)  12.3  H
 
  Absolute Lymphocytes (1.2 - 3.4 /CUMM)  2.2
 
  Absolute Monocytes (0.10 - 0.60 /CUMM)  1.3  H
 
  Absolute Eosinophils (0.0 - 0.7 /CUMM)  0.4
 
  Absolute Basophils (0.0 - 0.2 /CUMM)  0
 
  Platelet Estimate (ADEQUATE)  ADEQUATE
 
  Normocytic RBCs  VERIFIED
 
  Normochromic RBCs  VERIFIED
 
Urines  
 
  Urine Color (YEL,AMB,STR) YEL 
 
  Urine Clarity (CLEAR) CLEAR 
 
  Urine pH (5.0 - 8.0) 6.0 
 
  Ur Specific Gravity (1.001 - 1.035) 1.020 
 
  Urine Protein (NEG,<30 MG/DL) NEG 
 
  Urine Ketones (NEG) TRACE  H 
 
  Urine Nitrite (NEG) NEG 
 
  Urine Bilirubin (NEG) NEG 
 
  Urine Urobilinogen (0.1  -  1.0 EU/dl) 0.2 
 
  Ur Leukocyte Esterase (NEG) NEG 
 
  Ur Microscopic EXAM NOT REQUIRED 
 
  Urine Hemoglobin (NEG) NEG 
 
  Urine Glucose (N MG/DL) NEG 
 
 
 
 
Assessment/Plan
Assessment/Plan
A- HD2 sp bedside I&D of perianal abscess, improved.
P-
fu cultures- GNR prelim
fu cbc
cont allyssa/flag
home meds
prn pain meds
reg diet
oob, ambulate, hepsq
will dw attending
dc planning
 
 
Core Measures
 
Venous Thromboembolism
VTE Risk Factors Surgery
No Mechanical VTE Prophylaxis d/t N/A MechProphylax Ordered
No VTE Pharm Prophylaxis d/t NA PharmProphylax ordered